# Patient Record
Sex: FEMALE | Race: WHITE | Employment: UNEMPLOYED | URBAN - METROPOLITAN AREA
[De-identification: names, ages, dates, MRNs, and addresses within clinical notes are randomized per-mention and may not be internally consistent; named-entity substitution may affect disease eponyms.]

---

## 2020-09-26 ENCOUNTER — APPOINTMENT (OUTPATIENT)
Dept: CT IMAGING | Age: 70
End: 2020-09-26
Payer: MEDICARE

## 2020-09-26 ENCOUNTER — HOSPITAL ENCOUNTER (EMERGENCY)
Age: 70
Discharge: HOME | End: 2020-09-27
Attending: EMERGENCY MEDICINE
Payer: MEDICARE

## 2020-09-26 VITALS
TEMPERATURE: 97.5 F | OXYGEN SATURATION: 97 % | BODY MASS INDEX: 27.47 KG/M2 | RESPIRATION RATE: 14 BRPM | SYSTOLIC BLOOD PRESSURE: 180 MMHG | DIASTOLIC BLOOD PRESSURE: 102 MMHG | WEIGHT: 175 LBS | HEIGHT: 67 IN | HEART RATE: 73 BPM

## 2020-09-26 PROCEDURE — 70450 CT HEAD/BRAIN W/O DYE: CPT

## 2020-09-26 PROCEDURE — 99284 EMERGENCY DEPT VISIT MOD MDM: CPT

## 2020-09-26 RX ORDER — SIMVASTATIN 20 MG
20 TABLET ORAL NIGHTLY
COMMUNITY
Start: 2020-08-22

## 2020-09-27 ASSESSMENT — ENCOUNTER SYMPTOMS
GASTROINTESTINAL NEGATIVE: 1
EYES NEGATIVE: 1
RESPIRATORY NEGATIVE: 1

## 2020-09-27 NOTE — ED PROVIDER NOTES
ED Attending Attestation Note     Date of evaluation: 9/26/2020    This patient was seen by the resident. I have seen and examined the patient, agree with the workup, evaluation, management and diagnosis. The care plan has been discussed. My assessment reveals very well-appearing patient, younger appearing than her stated age, pleasantly conversational, and in no acute distress. She presents to the emergency department today after a fall with facial trauma, when her dog pulled her over while walking. She has some mild developing edema above and lateral to the right eye, with a tiny abrasion above the right brow, but no discrete laceration, and a tiny ecchymosis lateral to the eye. Extraocular movements are intact without evidence of entrapment. No other significant facial or head trauma or tenderness is noted. No cervical spine tenderness to palpation. Patient has excellent strength in all 4 extremities and is entirely neurologically intact.          Gerda Gregorio MD  09/27/20 0001
acute distress. Appearance: Normal appearance. She is normal weight. She is not ill-appearing. HENT:      Head: Normocephalic. Comments: Ecchymoses over right eyebrow and just lateral to right eye with mild swelling. No open laceration is present. Mouth/Throat:      Mouth: Mucous membranes are moist.      Pharynx: Oropharynx is clear. Eyes:      Extraocular Movements: Extraocular movements intact. Pupils: Pupils are equal, round, and reactive to light. Neck:      Musculoskeletal: Normal range of motion and neck supple. Cardiovascular:      Rate and Rhythm: Normal rate and regular rhythm. Pulses: Normal pulses. Heart sounds: Normal heart sounds. Pulmonary:      Effort: Pulmonary effort is normal.      Breath sounds: Normal breath sounds. Abdominal:      General: Abdomen is flat. Bowel sounds are normal.      Palpations: Abdomen is soft. Musculoskeletal: Normal range of motion. Skin:     General: Skin is warm and dry. Capillary Refill: Capillary refill takes less than 2 seconds. Neurological:      General: No focal deficit present. Mental Status: She is alert and oriented to person, place, and time. Mental status is at baseline. Psychiatric:         Mood and Affect: Mood normal.         Behavior: Behavior normal.         Thought Content: Thought content normal.         Judgment: Judgment normal.         Diagnostic Results     EKG   Not applicable    RADIOLOGY:  CT Head WO Contrast   Final Result   1. Normal brain   2. Inclusion cyst left parasagittal subcutis soft tissues. 3. Right zygomatic frontal and supraorbital soft tissue swelling. 4. No fracture of the maxillofacial bones. 5. Nasal septal deviation to the left          LABS:   No results found for this visit on 09/26/20.     ED BEDSIDE ULTRASOUND:  n/a    RECENT VITALS:  BP: (!) 180/102, Temp: 97.5 °F (36.4 °C),Pulse: 73, Resp: 14, SpO2: 97 %     Procedures     n/a    ED Course     Nursing Notes,

## 2020-09-27 NOTE — ED NOTES
Bed: A01-01  Expected date:   Expected time:   Means of arrival:   Comments:  Selwyn Castaneda RN  09/26/20 [id number]